# Patient Record
Sex: MALE | Race: WHITE | NOT HISPANIC OR LATINO | Employment: OTHER | URBAN - METROPOLITAN AREA
[De-identification: names, ages, dates, MRNs, and addresses within clinical notes are randomized per-mention and may not be internally consistent; named-entity substitution may affect disease eponyms.]

---

## 2017-10-26 ENCOUNTER — PATIENT OUTREACH (OUTPATIENT)
Dept: ADMINISTRATIVE | Facility: HOSPITAL | Age: 72
End: 2017-10-26

## 2017-10-26 NOTE — PROGRESS NOTES
\CALLED PATIENT. DR CORMIER IS NO LONGER HIS PCP. [facility] AND DOES NOT WANT TO SEE DR CORMIER ANY LONGER.